# Patient Record
Sex: MALE | Race: WHITE | NOT HISPANIC OR LATINO | Employment: OTHER | URBAN - NONMETROPOLITAN AREA
[De-identification: names, ages, dates, MRNs, and addresses within clinical notes are randomized per-mention and may not be internally consistent; named-entity substitution may affect disease eponyms.]

---

## 2023-12-10 ENCOUNTER — APPOINTMENT (OUTPATIENT)
Dept: GENERAL RADIOLOGY | Facility: HOSPITAL | Age: 68
End: 2023-12-10
Attending: NURSE PRACTITIONER
Payer: MEDICARE

## 2023-12-10 ENCOUNTER — HOSPITAL ENCOUNTER (EMERGENCY)
Facility: HOSPITAL | Age: 68
Discharge: HOME | End: 2023-12-10
Attending: NURSE PRACTITIONER | Admitting: NURSE PRACTITIONER
Payer: MEDICARE

## 2023-12-10 VITALS
TEMPERATURE: 98.1 F | DIASTOLIC BLOOD PRESSURE: 89 MMHG | RESPIRATION RATE: 16 BRPM | OXYGEN SATURATION: 94 % | SYSTOLIC BLOOD PRESSURE: 159 MMHG | HEART RATE: 90 BPM

## 2023-12-10 DIAGNOSIS — T14.8XXA PUNCTURE WOUND: ICD-10-CM

## 2023-12-10 PROCEDURE — 90715 TDAP VACCINE 7 YRS/> IM: CPT | Performed by: NURSE PRACTITIONER

## 2023-12-10 PROCEDURE — 90471 IMMUNIZATION ADMIN: CPT | Performed by: NURSE PRACTITIONER

## 2023-12-10 PROCEDURE — 99283 EMERGENCY DEPT VISIT LOW MDM: CPT | Mod: 25

## 2023-12-10 PROCEDURE — 99283 EMERGENCY DEPT VISIT LOW MDM: CPT | Performed by: NURSE PRACTITIONER

## 2023-12-10 PROCEDURE — 73130 X-RAY EXAM OF HAND: CPT | Mod: RT

## 2023-12-10 PROCEDURE — 250N000011 HC RX IP 250 OP 636: Performed by: NURSE PRACTITIONER

## 2023-12-10 RX ADMIN — CLOSTRIDIUM TETANI TOXOID ANTIGEN (FORMALDEHYDE INACTIVATED), CORYNEBACTERIUM DIPHTHERIAE TOXOID ANTIGEN (FORMALDEHYDE INACTIVATED), BORDETELLA PERTUSSIS TOXOID ANTIGEN (GLUTARALDEHYDE INACTIVATED), BORDETELLA PERTUSSIS FILAMENTOUS HEMAGGLUTININ ANTIGEN (FORMALDEHYDE INACTIVATED), BORDETELLA PERTUSSIS PERTACTIN ANTIGEN, AND BORDETELLA PERTUSSIS FIMBRIAE 2/3 ANTIGEN 0.5 ML: 5; 2; 2.5; 5; 3; 5 INJECTION, SUSPENSION INTRAMUSCULAR at 18:22

## 2023-12-10 ASSESSMENT — ENCOUNTER SYMPTOMS
PSYCHIATRIC NEGATIVE: 1
CARDIOVASCULAR NEGATIVE: 1
HEMATOLOGIC/LYMPHATIC NEGATIVE: 1
EYES NEGATIVE: 1
CONSTITUTIONAL NEGATIVE: 1
ENDOCRINE NEGATIVE: 1
NEUROLOGICAL NEGATIVE: 1
GASTROINTESTINAL NEGATIVE: 1
WOUND: 1
RESPIRATORY NEGATIVE: 1
MUSCULOSKELETAL NEGATIVE: 1
ALLERGIC/IMMUNOLOGIC NEGATIVE: 1

## 2023-12-10 NOTE — ED TRIAGE NOTES
Pt states that around 1200 he was attempting to set a ester cat trap when he accidentally set it off, and the trip wire gouged into his right hand between the third and fourth fingers. Pt states that the wire went into the hand approximately 2-3 inches, did not bleed. Pt denies any loss of feeling, he is able to move all of his fingers and wrist. Hand is swollen along with fingers.

## 2023-12-11 NOTE — ED NOTES
Urgent Care provider assessed patient in triage and determined patient not Urgent Care appropriate. Will be seen in Emergency Department.

## 2023-12-11 NOTE — ED NOTES
"Pt reports that he was out setting traps this afternoon when he had a wire from one of the traps puncture his skin between the right ring and pinky finger. Pt reports wire advanced about 1.5\" into the skin. Pt states there was no bleeding. Pt took 600 mg ibuprofen at 1700 with no relief. Pt states pain is 9/10 and throbbing. Pt also reports he feels like he has some swelling. No redness noted. Pts last tetanus was 12/12/2013. Will update today.   "

## 2023-12-11 NOTE — ED PROVIDER NOTES
History     Chief Complaint   Patient presents with    Hand Injury     HPI  Stephen Castro is a 67 year old individual comes in for evaluation of right hand injury.  Patient states he said a bobcat trap and it did snap and wire poked him in the hand.  This occurred between the middle and ring finger.  No loss of range of motion.  No paresthesias reported.  States that his hand is now starting to swell up and wrist is starting to hurt.    Allergies:  No Known Allergies    Problem List:    There are no problems to display for this patient.       Past Medical History:    No past medical history on file.    Past Surgical History:    No past surgical history on file.    Family History:    No family history on file.    Social History:  Marital Status:   [2]        Medications:    amoxicillin-clavulanate (AUGMENTIN) 875-125 MG tablet          Review of Systems   Constitutional: Negative.    HENT: Negative.     Eyes: Negative.    Respiratory: Negative.     Cardiovascular: Negative.    Gastrointestinal: Negative.    Endocrine: Negative.    Genitourinary: Negative.    Musculoskeletal: Negative.    Skin:  Positive for wound (Puncture wound between ring and middle finger of right hand.).   Allergic/Immunologic: Negative.    Neurological: Negative.    Hematological: Negative.    Psychiatric/Behavioral: Negative.         Physical Exam   BP: 144/79  Pulse: 94  Temp: 98.1  F (36.7  C)  Resp: 16  SpO2: 96 %      GENERAL APPEARANCE:  The patient is a 67 year old well-developed, well-nourished individual in no acute distress that appears as stated age.  EXTREMITIES: Swelling to right hand from wrist to digits.  Radial pulses are 2+ to the right hand.  Capillary refill less than 3 seconds to all digits of right hand.  MUSCULOSKELETAL: No crepitus or deformities.  Range of motion intact to all joints of the digits of right hand and right wrist.  NEUROLOGIC:  No focal sensory or motor deficits are noted.   PSYCHIATRIC:   The patient is awake, alert, and oriented x4.  Recent and remote memory is intact.  Appropriate mood and affect.  Calm and cooperative with history and physical exam.  SKIN:  Warm, dry, and well perfused.  Good turgor.  Swelling noted to the right hand.  Puncture wound noted to the edge of the dorsum of the right hand between the middle and ring finger.  No obvious foreign body or active bleeding.  TDAP STATUS: Last Tdap given 12/12/2013.       Comment: Discrepancies between my note and notes on behalf of the nursing team or other care providers are secondary to my findings reflecting my physical examination and questioning of the patient.  Any conflicting information provided is not in line with my examination of the patient.       ED Course              ED Course as of 12/10/23 1924   Sun Dec 10, 2023   1745 X-ray right hand ordered.   1805 In to see patient and history/physical completed.    1813 No foreign body noted.  CMS intact.  Due to increase in swelling will give antibiotics consisting of Augmentin 875/125 1 tab twice daily x 10 days.  Tdap updated.  Follow-up with PCP.  Patient in agreement.                  Results for orders placed or performed during the hospital encounter of 12/10/23 (from the past 24 hour(s))   XR Hand Right G/E 3 Views    Narrative    PROCEDURE:  XR HAND RIGHT G/E 3 VIEWS    HISTORY: Injury, R/O foreign body    COMPARISON: None    TECHNIQUE:  XR HAND RIGHT 3 VIEWS    FINDINGS:   No acute fracture or dislocation is identified. No suspicious osseous  lesion. Post surgical changes of arthrodesis of the trapezium,  trapezoid and scaphoid. Sclerosis about the first CMC and triscaphe  joints. Triangular fibrocartilage complex chondrocalcinosis. Chronic  healed fracture through the base of the ulnar styloid. Mild to  moderate degenerative changes of the first through third MCP joints.    No foreign body or subcutaneous emphysema.        Impression    IMPRESSION:   No acute osseous  abnormality. No foreign body.    MISSY MARTINEZ MD         SYSTEM ID:  [id number]       Medications   Tdap (tetanus-diphtheria-acell pertussis) (ADACEL) injection 0.5 mL (0.5 mLs Intramuscular $Given 12/10/23 1822)       Assessments & Plan (with Medical Decision Making)     I have reviewed the nursing notes.    I have reviewed the findings, diagnosis, plan and need for follow up with the patient.      Summary:  Patient presents to the ER today for puncture wound to right hand.  Potential diagnosis which have been considered and evaluated include puncture, foreign body, cellulitis, as well as others. Many of these have been excluded using the various modalities and assessment as noted on the chart. At the present time, the diagnosis given seems to be the most likely puncture wound to right hand.  Upon arrival, vitals signs are normal.  The patient is alert and oriented no distress.  Does have swelling to the right hand from the wrist to the digits.  CMS intact to all joints and wrist of the right hand.  Capillary refill normal.  Pulses normal in the right radius.  Does have a puncture wound between the middle and ring finger noted but no discharge, redness, bleeding noted.  X-ray of right hand personally reviewed showing no fracture or foreign body.    Patient's tetanus updated.  Does have swelling of the hand with puncture wound of the hand.  Due to animal trap being used will discharge home on Augmentin 875/125 1 tab twice daily x 10 days.  Follow-up with PCP this week for reevaluation.  Return to ER if new or worsening symptoms.  Patient verbalized understand agrees with plan of care.  Patient to discharge home.        Critical Care Time: None    Impression and plan discussed with patient. Questions answered, concerns addressed, indications for urgent re-evaluation reviewed, and  given. Patient/Parent/Caregiver agree with treatment plan and have no further questions at this time.  AVS provided at  discharge.    This note was created by the Dragon Voice Dictation System. Inadvertent typographical errors, due to software recognition problems, may still exist.             Discharge Medication List as of 12/10/2023  6:24 PM        START taking these medications    Details   amoxicillin-clavulanate (AUGMENTIN) 875-125 MG tablet Take 1 tablet by mouth 2 times daily, Disp-20 tablet, R-0, [facility]             Final diagnoses:   Puncture wound       12/10/2023   [facility]       Samuel Acosta APRN CNP  12/10/23 1827       Sameul Acosta APRN CNP  12/10/23 1924

## 2023-12-11 NOTE — DISCHARGE INSTRUCTIONS
Antibiotic use:   An antibiotic was ordered to help fight the bacterial infection that was acquired.  You need to take this medication exactly as prescribed.  DO NOT stop taking this medication until the prescribed end date, even if you are feeling better.  This way the affecting bacteria in your body are killed off.   You should eat probiotic yogurt (with live cultures) or Kefir (similar to yogurt) while taking antibiotic to promote regrowth of good bacteria in your digestive tract, which can be depleted by antibiotics.  Birth control and antibiotics (if applicable):   Birth control pills contain estrogens. Some antibiotics cause the enzymes in the liver to increase the break-down of estrogens and thereby can decrease the levels of estrogens in the body and the effectiveness of the birth control medications.  This can result in unwanted pregnancy.  To be safe it is wise to use a back-up-method of birth control while you take, and for 7 days after finishing the antibiotic.  Coumadin and antibiotics (if applicable):   Finally, if on coumadin, let your coumadin prescriber know. You likely need to have your INR checked by your Primary Care Provider in 2-3 days. Contact your clinic for an appointment.        Pain control:   If your past medical conditions, allergies, current medications, or current status does not prevent you from using acetaminophen and/or ibuprofen, use the following:   Acetaminophen 650-1000 mg every 6 hours as needed for pain in addition to ibuprofen 400 mg every 6 hours as needed for pain.  Take these two medications together if wanted.    Remember that these are for AS NEEDED.  If not needed, do not take.         Follow-up with your primary care provider for reevaluation.  Contact your primary care provider if you have any questions or concerns.  Do not hesitate to return to the ER if any new or worsening symptoms.     Please read the attached instructions (if any).  They highlight more specific  treatments and interventions for you at home.              Thank you for letting me participate in your care and wish you a fast and uneventful recovery,    Samuel SMITH, CNP    Do not hesitate to contact me with questions or concerns.  [email or web address]  [email or web address]

## 2023-12-11 NOTE — ED NOTES
Patient discharged to Home at this time. Patient given written and verbal discharge instructions regarding home care, follow-up, and medications. Patient verbalized understanding of all discharge instructions. Rest and hydration encouraged. Patient encouraged to return to the ED if they experiences any signs of infection, redness, fevers, drainage from the wound, or any new, worsening, or concerning symptoms.     Pain management 650-1000 mg acetaminophen every 6 hours as needed in addition to 400 mg ibuprofen every 6 hours as needed.    Patients RX for augmentin sent to [facility] pharmacy.    Patient to follow-up with in as needed with PCP.